# Patient Record
Sex: FEMALE | Race: WHITE | NOT HISPANIC OR LATINO | Employment: FULL TIME | URBAN - METROPOLITAN AREA
[De-identification: names, ages, dates, MRNs, and addresses within clinical notes are randomized per-mention and may not be internally consistent; named-entity substitution may affect disease eponyms.]

---

## 2017-08-02 ENCOUNTER — TRANSCRIBE ORDERS (OUTPATIENT)
Dept: MAMMOGRAPHY | Facility: MEDICAL CENTER | Age: 52
End: 2017-08-02

## 2017-08-02 ENCOUNTER — APPOINTMENT (OUTPATIENT)
Dept: RADIOLOGY | Facility: MEDICAL CENTER | Age: 52
End: 2017-08-02
Attending: UROLOGY
Payer: COMMERCIAL

## 2017-08-02 ENCOUNTER — GENERIC CONVERSION - ENCOUNTER (OUTPATIENT)
Dept: OTHER | Facility: OTHER | Age: 52
End: 2017-08-02

## 2017-08-02 DIAGNOSIS — N20.0 CALCULUS OF KIDNEY: ICD-10-CM

## 2017-08-02 PROCEDURE — 74000 HB X-RAY EXAM OF ABDOMEN (SINGLE ANTEROPOSTERIOR VIEW): CPT

## 2018-01-22 VITALS
SYSTOLIC BLOOD PRESSURE: 116 MMHG | DIASTOLIC BLOOD PRESSURE: 74 MMHG | HEIGHT: 63 IN | WEIGHT: 179 LBS | BODY MASS INDEX: 31.71 KG/M2

## 2018-06-11 LAB
ALBUMIN SERPL BCP-MCNC: 4.3 G/DL (ref 3–5.2)
ALP SERPL-CCNC: 91 U/L (ref 43–122)
ALT SERPL W P-5'-P-CCNC: 33 U/L (ref 9–52)
ANION GAP SERPL CALCULATED.3IONS-SCNC: 10 MMOL/L (ref 5–14)
AST SERPL W P-5'-P-CCNC: 26 U/L (ref 14–36)
BILIRUB SERPL-MCNC: 0.3 MG/DL
BUN SERPL-MCNC: 14 MG/DL (ref 5–25)
CALCIUM SERPL-MCNC: 9.8 MG/DL (ref 8.4–10.2)
CHLORIDE SERPL-SCNC: 102 MEQ/L (ref 97–108)
CHOLEST SERPL-MCNC: 248 MG/DL
CHOLEST/HDLC SERPL: 2.6 {RATIO}
CO2 SERPL-SCNC: 30 MMOL/L (ref 22–30)
CREATINE, SERUM (HISTORICAL): 1.01 MG/DL (ref 0.6–1.2)
EGFR (HISTORICAL): 58 ML/MIN/1.73 M2
GLUCOSE FASTING (HISTORICAL): 103 MG/DL (ref 70–99)
HDLC SERPL-MCNC: 95 MG/DL
LDL/HDL RATIO (HISTORICAL): 1.4
LDLC SERPL CALC-MCNC: 136 MG/DL
POTASSIUM SERPL-SCNC: 4.5 MEQ/L (ref 3.6–5)
SODIUM SERPL-SCNC: 142 MEQ/L (ref 137–147)
TOTAL PROTEIN (HISTORICAL): 7.5 G/DL (ref 5.9–8.4)
TRIGL SERPL-MCNC: 87 MG/DL
VLDLC SERPL CALC-MCNC: 17 MG/DL (ref 0–40)

## 2018-12-04 ENCOUNTER — OFFICE VISIT (OUTPATIENT)
Dept: FAMILY MEDICINE CLINIC | Facility: CLINIC | Age: 53
End: 2018-12-04
Payer: COMMERCIAL

## 2018-12-04 VITALS
SYSTOLIC BLOOD PRESSURE: 130 MMHG | HEIGHT: 63 IN | TEMPERATURE: 98.9 F | OXYGEN SATURATION: 97 % | WEIGHT: 180.6 LBS | BODY MASS INDEX: 32 KG/M2 | DIASTOLIC BLOOD PRESSURE: 80 MMHG | HEART RATE: 110 BPM | RESPIRATION RATE: 16 BRPM

## 2018-12-04 DIAGNOSIS — Z00.00 HEALTHCARE MAINTENANCE: Primary | ICD-10-CM

## 2018-12-04 DIAGNOSIS — R94.6 ABNORMAL RESULTS OF THYROID FUNCTION STUDIES: ICD-10-CM

## 2018-12-04 DIAGNOSIS — F32.A DEPRESSIVE DISORDER: ICD-10-CM

## 2018-12-04 DIAGNOSIS — E78.49 OTHER HYPERLIPIDEMIA: ICD-10-CM

## 2018-12-04 DIAGNOSIS — E55.9 VITAMIN D INSUFFICIENCY: ICD-10-CM

## 2018-12-04 PROBLEM — N20.0 KIDNEY STONE: Status: ACTIVE | Noted: 2017-08-02

## 2018-12-04 PROCEDURE — 99396 PREV VISIT EST AGE 40-64: CPT | Performed by: FAMILY MEDICINE

## 2018-12-04 RX ORDER — VENLAFAXINE 75 MG/1
TABLET ORAL
COMMUNITY

## 2018-12-04 RX ORDER — LIOTHYRONINE SODIUM 25 UG/1
25 TABLET ORAL DAILY
Refills: 1 | COMMUNITY
Start: 2018-10-08 | End: 2021-12-21

## 2018-12-04 RX ORDER — ERGOCALCIFEROL (VITAMIN D2) 10 MCG
TABLET ORAL
COMMUNITY

## 2018-12-04 RX ORDER — BREXPIPRAZOLE 0.5 MG/1
0.5 TABLET ORAL DAILY
Refills: 0 | COMMUNITY
Start: 2018-09-21 | End: 2018-12-04 | Stop reason: ALTCHOICE

## 2018-12-04 RX ORDER — BUPROPION HYDROCHLORIDE 300 MG/1
300 TABLET ORAL DAILY
Refills: 0 | COMMUNITY
Start: 2018-10-01 | End: 2020-01-20

## 2018-12-04 NOTE — PROGRESS NOTES
Assessment/Plan:     There are no diagnoses linked to this encounter  There are no Patient Instructions on file for this visit  No Follow-up on file  Subjective:      Patient ID: Reyna Leach is a 47 y o  female  Chief Complaint   Patient presents with    Annual Exam       She is here today for wellness visit  She just started exercising to try to lose some weight  She denies any complain  She had blood work done at work and it showed hyperlipidemia and slightly decreased GFR  The following portions of the patient's history were reviewed and updated as appropriate: allergies, current medications, past family history, past medical history, past social history, past surgical history and problem list     Review of Systems   Constitutional: Negative for chills and fever  HENT: Negative for trouble swallowing  Eyes: Negative for visual disturbance  Respiratory: Negative for cough and shortness of breath  Cardiovascular: Negative for chest pain, palpitations and leg swelling  Gastrointestinal: Negative for abdominal pain, constipation and diarrhea  Endocrine: Negative for cold intolerance and heat intolerance  Genitourinary: Negative for difficulty urinating and dysuria  Musculoskeletal: Negative for gait problem  Skin: Negative for rash  Neurological: Negative for dizziness, tremors, seizures and headaches  Hematological: Negative for adenopathy  Psychiatric/Behavioral: Negative for behavioral problems  Current Outpatient Prescriptions   Medication Sig Dispense Refill    buPROPion (WELLBUTRIN XL) 300 mg 24 hr tablet Take 300 mg by mouth daily  0    liothyronine (CYTOMEL) 25 mcg tablet Take 25 mcg by mouth daily  1    Multiple Vitamin (DAILY VALUE MULTIVITAMIN) TABS Take by mouth      venlafaxine (EFFEXOR) 75 mg tablet Take 75 mg by mouth 3 (three) times a day       No current facility-administered medications for this visit          Objective:    /80 (BP Location: Left arm, Patient Position: Sitting, Cuff Size: Adult)   Pulse (!) 110   Temp 98 9 °F (37 2 °C) (Tympanic)   Resp 16   Ht 5' 3" (1 6 m)   Wt 81 9 kg (180 lb 9 6 oz)   SpO2 97%   BMI 31 99 kg/m²        Physical Exam   Constitutional: She is oriented to person, place, and time  She appears well-developed and well-nourished  HENT:   Head: Normocephalic and atraumatic  Eyes: Pupils are equal, round, and reactive to light  EOM are normal    Neck: Normal range of motion  Neck supple  Cardiovascular: Normal rate, regular rhythm and normal heart sounds  Pulmonary/Chest: Effort normal and breath sounds normal    Abdominal: Soft  Bowel sounds are normal    Musculoskeletal: Normal range of motion  She exhibits no edema  Lymphadenopathy:     She has no cervical adenopathy  Neurological: She is alert and oriented to person, place, and time  No cranial nerve deficit  Skin: Skin is warm  Psychiatric: She has a normal mood and affect  Nursing note and vitals reviewed               Hiral Lock MD

## 2019-04-15 ENCOUNTER — TRANSCRIBE ORDERS (OUTPATIENT)
Dept: ADMINISTRATIVE | Facility: HOSPITAL | Age: 54
End: 2019-04-15

## 2019-04-15 ENCOUNTER — APPOINTMENT (OUTPATIENT)
Dept: LAB | Facility: IMAGING CENTER | Age: 54
End: 2019-04-15
Payer: COMMERCIAL

## 2019-04-15 DIAGNOSIS — E78.49 OTHER HYPERLIPIDEMIA: ICD-10-CM

## 2019-04-15 DIAGNOSIS — R94.6 ABNORMAL RESULTS OF THYROID FUNCTION STUDIES: ICD-10-CM

## 2019-04-15 DIAGNOSIS — F32.A DEPRESSIVE DISORDER: ICD-10-CM

## 2019-04-15 DIAGNOSIS — E55.9 VITAMIN D INSUFFICIENCY: ICD-10-CM

## 2019-04-15 LAB
25(OH)D3 SERPL-MCNC: 29.1 NG/ML (ref 30–100)
ALBUMIN SERPL BCP-MCNC: 3.9 G/DL (ref 3.5–5)
ALP SERPL-CCNC: 90 U/L (ref 46–116)
ALT SERPL W P-5'-P-CCNC: 31 U/L (ref 12–78)
ANION GAP SERPL CALCULATED.3IONS-SCNC: 4 MMOL/L (ref 4–13)
AST SERPL W P-5'-P-CCNC: 21 U/L (ref 5–45)
BASOPHILS # BLD AUTO: 0.04 THOUSANDS/ΜL (ref 0–0.1)
BASOPHILS NFR BLD AUTO: 1 % (ref 0–1)
BILIRUB SERPL-MCNC: 0.41 MG/DL (ref 0.2–1)
BUN SERPL-MCNC: 13 MG/DL (ref 5–25)
CALCIUM SERPL-MCNC: 8.8 MG/DL (ref 8.3–10.1)
CHLORIDE SERPL-SCNC: 106 MMOL/L (ref 100–108)
CHOLEST SERPL-MCNC: 241 MG/DL (ref 50–200)
CO2 SERPL-SCNC: 28 MMOL/L (ref 21–32)
CREAT SERPL-MCNC: 1.08 MG/DL (ref 0.6–1.3)
EOSINOPHIL # BLD AUTO: 0.25 THOUSAND/ΜL (ref 0–0.61)
EOSINOPHIL NFR BLD AUTO: 4 % (ref 0–6)
ERYTHROCYTE [DISTWIDTH] IN BLOOD BY AUTOMATED COUNT: 12.4 % (ref 11.6–15.1)
GFR SERPL CREATININE-BSD FRML MDRD: 59 ML/MIN/1.73SQ M
GLUCOSE P FAST SERPL-MCNC: 98 MG/DL (ref 65–99)
HCT VFR BLD AUTO: 43.8 % (ref 34.8–46.1)
HDLC SERPL-MCNC: 85 MG/DL (ref 40–60)
HGB BLD-MCNC: 14.4 G/DL (ref 11.5–15.4)
IMM GRANULOCYTES # BLD AUTO: 0.01 THOUSAND/UL (ref 0–0.2)
IMM GRANULOCYTES NFR BLD AUTO: 0 % (ref 0–2)
LDLC SERPL CALC-MCNC: 142 MG/DL (ref 0–100)
LYMPHOCYTES # BLD AUTO: 1.9 THOUSANDS/ΜL (ref 0.6–4.47)
LYMPHOCYTES NFR BLD AUTO: 30 % (ref 14–44)
MCH RBC QN AUTO: 30.6 PG (ref 26.8–34.3)
MCHC RBC AUTO-ENTMCNC: 32.9 G/DL (ref 31.4–37.4)
MCV RBC AUTO: 93 FL (ref 82–98)
MONOCYTES # BLD AUTO: 0.43 THOUSAND/ΜL (ref 0.17–1.22)
MONOCYTES NFR BLD AUTO: 7 % (ref 4–12)
NEUTROPHILS # BLD AUTO: 3.69 THOUSANDS/ΜL (ref 1.85–7.62)
NEUTS SEG NFR BLD AUTO: 58 % (ref 43–75)
NRBC BLD AUTO-RTO: 0 /100 WBCS
PLATELET # BLD AUTO: 352 THOUSANDS/UL (ref 149–390)
PMV BLD AUTO: 10.6 FL (ref 8.9–12.7)
POTASSIUM SERPL-SCNC: 4.2 MMOL/L (ref 3.5–5.3)
PROT SERPL-MCNC: 7.4 G/DL (ref 6.4–8.2)
RBC # BLD AUTO: 4.7 MILLION/UL (ref 3.81–5.12)
SODIUM SERPL-SCNC: 138 MMOL/L (ref 136–145)
TRIGL SERPL-MCNC: 68 MG/DL
TSH SERPL DL<=0.05 MIU/L-ACNC: 2.33 UIU/ML (ref 0.36–3.74)
WBC # BLD AUTO: 6.32 THOUSAND/UL (ref 4.31–10.16)

## 2019-04-15 PROCEDURE — 80053 COMPREHEN METABOLIC PANEL: CPT

## 2019-04-15 PROCEDURE — 80061 LIPID PANEL: CPT

## 2019-04-15 PROCEDURE — 36415 COLL VENOUS BLD VENIPUNCTURE: CPT

## 2019-04-15 PROCEDURE — 84443 ASSAY THYROID STIM HORMONE: CPT

## 2019-04-15 PROCEDURE — 85025 COMPLETE CBC W/AUTO DIFF WBC: CPT

## 2019-04-15 PROCEDURE — 82306 VITAMIN D 25 HYDROXY: CPT

## 2019-04-22 ENCOUNTER — OFFICE VISIT (OUTPATIENT)
Dept: FAMILY MEDICINE CLINIC | Facility: CLINIC | Age: 54
End: 2019-04-22
Payer: COMMERCIAL

## 2019-04-22 VITALS
WEIGHT: 178.25 LBS | DIASTOLIC BLOOD PRESSURE: 90 MMHG | BODY MASS INDEX: 31.58 KG/M2 | SYSTOLIC BLOOD PRESSURE: 132 MMHG | TEMPERATURE: 98.6 F | HEART RATE: 98 BPM | RESPIRATION RATE: 16 BRPM | OXYGEN SATURATION: 100 % | HEIGHT: 63 IN

## 2019-04-22 DIAGNOSIS — E78.49 OTHER HYPERLIPIDEMIA: Primary | ICD-10-CM

## 2019-04-22 DIAGNOSIS — Z12.31 ENCOUNTER FOR SCREENING MAMMOGRAM FOR MALIGNANT NEOPLASM OF BREAST: ICD-10-CM

## 2019-04-22 DIAGNOSIS — F32.A DEPRESSIVE DISORDER: ICD-10-CM

## 2019-04-22 DIAGNOSIS — Z12.11 ENCOUNTER FOR SCREENING FOR MALIGNANT NEOPLASM OF COLON: ICD-10-CM

## 2019-04-22 DIAGNOSIS — E55.9 VITAMIN D INSUFFICIENCY: ICD-10-CM

## 2019-04-22 PROCEDURE — 99214 OFFICE O/P EST MOD 30 MIN: CPT | Performed by: FAMILY MEDICINE

## 2019-04-22 PROCEDURE — 3008F BODY MASS INDEX DOCD: CPT | Performed by: FAMILY MEDICINE

## 2019-04-22 RX ORDER — ATORVASTATIN CALCIUM 20 MG/1
20 TABLET, FILM COATED ORAL DAILY
Qty: 30 TABLET | Refills: 5 | Status: CANCELLED | OUTPATIENT
Start: 2019-04-22

## 2020-01-17 DIAGNOSIS — F32.A DEPRESSION: Primary | ICD-10-CM

## 2020-01-20 RX ORDER — BUPROPION HYDROCHLORIDE 300 MG/1
TABLET ORAL
Qty: 90 TABLET | Refills: 0 | Status: SHIPPED | OUTPATIENT
Start: 2020-01-20

## 2020-02-13 RX ORDER — LIOTHYRONINE SODIUM 25 UG/1
TABLET ORAL
Qty: 30 TABLET | Refills: 5 | OUTPATIENT
Start: 2020-02-13

## 2020-02-14 RX ORDER — LIOTHYRONINE SODIUM 25 UG/1
TABLET ORAL
Qty: 30 TABLET | Refills: 5 | OUTPATIENT
Start: 2020-02-14

## 2020-08-11 ENCOUNTER — APPOINTMENT (EMERGENCY)
Dept: CT IMAGING | Facility: HOSPITAL | Age: 55
End: 2020-08-11
Payer: COMMERCIAL

## 2020-08-11 ENCOUNTER — HOSPITAL ENCOUNTER (EMERGENCY)
Facility: HOSPITAL | Age: 55
Discharge: HOME | End: 2020-08-11
Attending: EMERGENCY MEDICINE | Admitting: EMERGENCY MEDICINE
Payer: COMMERCIAL

## 2020-08-11 VITALS
SYSTOLIC BLOOD PRESSURE: 140 MMHG | HEART RATE: 90 BPM | RESPIRATION RATE: 16 BRPM | BODY MASS INDEX: 32.26 KG/M2 | OXYGEN SATURATION: 97 % | DIASTOLIC BLOOD PRESSURE: 70 MMHG | WEIGHT: 182.1 LBS | TEMPERATURE: 98.7 F

## 2020-08-11 DIAGNOSIS — K52.9 COLITIS: Primary | ICD-10-CM

## 2020-08-11 DIAGNOSIS — K62.5 RECTAL BLEEDING: ICD-10-CM

## 2020-08-11 LAB
ALBUMIN SERPL BCP-MCNC: 4.1 G/DL (ref 3.5–5)
ALP SERPL-CCNC: 98 U/L (ref 46–116)
ALT SERPL W P-5'-P-CCNC: 39 U/L (ref 12–78)
ANION GAP SERPL CALCULATED.3IONS-SCNC: 12 MMOL/L (ref 4–13)
APTT PPP: 26 SECONDS (ref 23–37)
AST SERPL W P-5'-P-CCNC: 26 U/L (ref 5–45)
BACTERIA UR QL AUTO: ABNORMAL /HPF
BASOPHILS # BLD AUTO: 0.02 THOUSANDS/ΜL (ref 0–0.1)
BASOPHILS NFR BLD AUTO: 0 % (ref 0–1)
BILIRUB SERPL-MCNC: 0.38 MG/DL (ref 0.2–1)
BILIRUB UR QL STRIP: NEGATIVE
BUN SERPL-MCNC: 10 MG/DL (ref 5–25)
CALCIUM SERPL-MCNC: 9 MG/DL (ref 8.3–10.1)
CHLORIDE SERPL-SCNC: 105 MMOL/L (ref 100–108)
CLARITY UR: CLEAR
CO2 SERPL-SCNC: 25 MMOL/L (ref 21–32)
COLOR UR: YELLOW
CREAT SERPL-MCNC: 1.29 MG/DL (ref 0.6–1.3)
EOSINOPHIL # BLD AUTO: 0.09 THOUSAND/ΜL (ref 0–0.61)
EOSINOPHIL NFR BLD AUTO: 1 % (ref 0–6)
ERYTHROCYTE [DISTWIDTH] IN BLOOD BY AUTOMATED COUNT: 12.4 % (ref 11.6–15.1)
GFR SERPL CREATININE-BSD FRML MDRD: 47 ML/MIN/1.73SQ M
GLUCOSE SERPL-MCNC: 79 MG/DL (ref 65–140)
GLUCOSE UR STRIP-MCNC: NEGATIVE MG/DL
HCT VFR BLD AUTO: 44.1 % (ref 34.8–46.1)
HGB BLD-MCNC: 14.2 G/DL (ref 11.5–15.4)
HGB UR QL STRIP.AUTO: NEGATIVE
IMM GRANULOCYTES # BLD AUTO: 0.05 THOUSAND/UL (ref 0–0.2)
IMM GRANULOCYTES NFR BLD AUTO: 0 % (ref 0–2)
INR PPP: 0.94 (ref 0.84–1.19)
KETONES UR STRIP-MCNC: NEGATIVE MG/DL
LEUKOCYTE ESTERASE UR QL STRIP: NEGATIVE
LYMPHOCYTES # BLD AUTO: 1.85 THOUSANDS/ΜL (ref 0.6–4.47)
LYMPHOCYTES NFR BLD AUTO: 17 % (ref 14–44)
MCH RBC QN AUTO: 30.7 PG (ref 26.8–34.3)
MCHC RBC AUTO-ENTMCNC: 32.2 G/DL (ref 31.4–37.4)
MCV RBC AUTO: 95 FL (ref 82–98)
MONOCYTES # BLD AUTO: 0.62 THOUSAND/ΜL (ref 0.17–1.22)
MONOCYTES NFR BLD AUTO: 6 % (ref 4–12)
NEUTROPHILS # BLD AUTO: 8.54 THOUSANDS/ΜL (ref 1.85–7.62)
NEUTS SEG NFR BLD AUTO: 76 % (ref 43–75)
NITRITE UR QL STRIP: NEGATIVE
NON-SQ EPI CELLS URNS QL MICRO: ABNORMAL /HPF
NRBC BLD AUTO-RTO: 0 /100 WBCS
PH UR STRIP.AUTO: 5.5 [PH] (ref 4.5–8)
PLATELET # BLD AUTO: 357 THOUSANDS/UL (ref 149–390)
PMV BLD AUTO: 9.8 FL (ref 8.9–12.7)
POTASSIUM SERPL-SCNC: 3.3 MMOL/L (ref 3.5–5.3)
PROT SERPL-MCNC: 8 G/DL (ref 6.4–8.2)
PROT UR STRIP-MCNC: ABNORMAL MG/DL
PROTHROMBIN TIME: 12.4 SECONDS (ref 11.6–14.5)
RBC # BLD AUTO: 4.63 MILLION/UL (ref 3.81–5.12)
RBC #/AREA URNS AUTO: ABNORMAL /HPF
SODIUM SERPL-SCNC: 142 MMOL/L (ref 136–145)
SP GR UR STRIP.AUTO: >=1.03 (ref 1–1.03)
UROBILINOGEN UR QL STRIP.AUTO: 0.2 E.U./DL
WBC # BLD AUTO: 11.17 THOUSAND/UL (ref 4.31–10.16)
WBC #/AREA URNS AUTO: ABNORMAL /HPF

## 2020-08-11 PROCEDURE — 36415 COLL VENOUS BLD VENIPUNCTURE: CPT | Performed by: PHYSICIAN ASSISTANT

## 2020-08-11 PROCEDURE — G1004 CDSM NDSC: HCPCS

## 2020-08-11 PROCEDURE — 85610 PROTHROMBIN TIME: CPT | Performed by: PHYSICIAN ASSISTANT

## 2020-08-11 PROCEDURE — 74178 CT ABD&PLV WO CNTR FLWD CNTR: CPT

## 2020-08-11 PROCEDURE — 99285 EMERGENCY DEPT VISIT HI MDM: CPT

## 2020-08-11 PROCEDURE — 81001 URINALYSIS AUTO W/SCOPE: CPT

## 2020-08-11 PROCEDURE — 80053 COMPREHEN METABOLIC PANEL: CPT | Performed by: PHYSICIAN ASSISTANT

## 2020-08-11 PROCEDURE — 99284 EMERGENCY DEPT VISIT MOD MDM: CPT | Performed by: EMERGENCY MEDICINE

## 2020-08-11 PROCEDURE — 85730 THROMBOPLASTIN TIME PARTIAL: CPT | Performed by: PHYSICIAN ASSISTANT

## 2020-08-11 PROCEDURE — 96360 HYDRATION IV INFUSION INIT: CPT

## 2020-08-11 PROCEDURE — 85025 COMPLETE CBC W/AUTO DIFF WBC: CPT | Performed by: PHYSICIAN ASSISTANT

## 2020-08-11 RX ORDER — POTASSIUM CHLORIDE 20 MEQ/1
40 TABLET, EXTENDED RELEASE ORAL ONCE
Status: COMPLETED | OUTPATIENT
Start: 2020-08-11 | End: 2020-08-11

## 2020-08-11 RX ADMIN — IOHEXOL 100 ML: 350 INJECTION, SOLUTION INTRAVENOUS at 15:51

## 2020-08-11 RX ADMIN — SODIUM CHLORIDE 1000 ML: 0.9 INJECTION, SOLUTION INTRAVENOUS at 14:54

## 2020-08-11 RX ADMIN — POTASSIUM CHLORIDE 40 MEQ: 1500 TABLET, EXTENDED RELEASE ORAL at 15:58

## 2020-08-11 NOTE — ED ATTENDING ATTESTATION
8/11/2020  I, Jerilyn Melendez DO, saw and evaluated the patient  I have discussed the patient with the resident/non-physician practitioner and agree with the resident's/non-physician practitioner's findings, Plan of Care, and MDM as documented in the resident's/non-physician practitioner's note, except where noted  All available labs and Radiology studies were reviewed  I was present for key portions of any procedure(s) performed by the resident/non-physician practitioner and I was immediately available to provide assistance  At this point I agree with the current assessment done in the Emergency Department  I have conducted an independent evaluation of this patient a history and physical is as follows:    48 yo F presenting for evaluation of blood in stools  Pt reports that her last normal BM was yesterday which was diarrhea and without any blood/dark color  Today, pt had a BM around 8:30 am that is described as maroon colored with clot  This was followed by 3 episodes this afternoon, the 1st maroon and the 2nd due being described as wojciech blood without any stool mixed in  Denies any constipation/straining  No history of similar  Mother with h/o colon ca diagnosed in late 35s, therefore pt has been having serial colonoscopies for some time, however it has been a few years since her last  No blood thinners  Denies other areas of bleeding  No sx of symptomatic anemia such as lightheadedness, dizziness, near syncope, CP, SOB  Denies any abdominal pain, N/V  She does report urinary frequency  Abdomen soft/nontender, rectal exam performed by AP with no external/internal abnormalities noted, mucous hemoccult negative      MDM: 48 yo F with reported blood per rectum- will get CBC to r/o anemia and transfuse if needed, check coags, CMP to assess liver function/renal function/lytes, IVF, CT A/P to assess for mass/possible area of active bleeding, if all stable will have pt f/u with GI for repeat colonoscopy if needed, close return precautions         ED Course         Critical Care Time  Procedures

## 2020-08-11 NOTE — DISCHARGE INSTRUCTIONS
Please call [facility] or your gastroenterologist to schedule a follow up appointment to discuss EGD/colonoscopy  Return to the ED with any new or worsening symptoms as discussed

## 2020-08-11 NOTE — ED PROVIDER NOTES
History  Chief Complaint   Patient presents with    Black or Bloody Stool     patient c/o blood from anus that started at 0830  denies dizziness and SOB  stating she has " diarrhea and its a dark red color"  Jose Mcmillan is a 49year old female with no significant past medical history presenting to the ED for evaluation of rectal bleeding beginning this morning with 3 episodes in total  Patient relates that this morning she had a bowel movement around 830 in which there was stool and a small amount of maroon-colored blood present  She states that later this afternoon she had two incidents of urgency to have a bowel movement but only a small amount of wojciech red and maroon colored blood with clots was present in the toilet bowel  She denies any abdominal pain or rectal pain, dizziness, lightheadedness, weakness, pallor, back pain or flank pain  Patient denies constipation admitting to an episode of diarrhea yesterday evening  She does admit to urinary frequency but attributes this to drinking a lot of soda  She denies dysuria or hematuria  Denies fevers, chills, sweats, chest pain, palpitations, shortness of breath  Additionally denies anticoagulant use  She admits to having episodes of rectal bleeding in the past but not to this extent  Of note, patient does have a family history of colon cancer for which the patient has colonoscopies performed every 5 years  She is unsure of her last colonoscopy but thinks it was performed within the past 3-5 years  History provided by:  Patient  Black or Bloody Stool   Amount:   Moderate  Timing:  Intermittent  Chronicity:  New  Context: diarrhea    Context: not anal fissures, not constipation, not hemorrhoids and not rectal pain    Relieved by:  Nothing  Ineffective treatments:  None tried  Associated symptoms: no abdominal pain, no dizziness, no fever, no hematemesis, no light-headedness, no loss of consciousness, no recent illness and no vomiting    Risk factors: no anticoagulant use and no hx of colorectal cancer (family history of colon caner)        Prior to Admission Medications   Prescriptions Last Dose Informant Patient Reported? Taking? Multiple Vitamin (DAILY VALUE MULTIVITAMIN) TABS  Self Yes No   Sig: Take by mouth   buPROPion (WELLBUTRIN XL) 300 mg 24 hr tablet   No No   Sig: TAKE 1 TABLET BY MOUTH EVERY DAY   liothyronine (CYTOMEL) 25 mcg tablet  Self Yes No   Sig: Take 25 mcg by mouth daily   venlafaxine (EFFEXOR) 75 mg tablet  Self Yes No   Sig: Take 75 mg by mouth daily Take 3 tablets in the am      Facility-Administered Medications: None       Past Medical History:   Diagnosis Date    Depression     Disease of thyroid gland     Hyperlipidemia     Hyperlipidemia     Kidney stone        Past Surgical History:   Procedure Laterality Date    ANTERIOR CRUCIATE LIGAMENT REPAIR Right     CYST REMOVAL      left knee carilage/cyst removed    HERNIA REPAIR      removal       Family History   Problem Relation Age of Onset    Colon cancer Mother 44     I have reviewed and agree with the history as documented  E-Cigarette/Vaping     E-Cigarette/Vaping Substances     Social History     Tobacco Use    Smoking status: Never Smoker    Smokeless tobacco: Never Used   Substance Use Topics    Alcohol use: Yes     Comment: socially    Drug use: No       Review of Systems   Constitutional: Negative for appetite change, chills and fever  Respiratory: Negative for cough, chest tightness and shortness of breath  Cardiovascular: Negative for chest pain and palpitations  Gastrointestinal: Positive for anal bleeding and blood in stool  Negative for abdominal pain, constipation, hematemesis, nausea, rectal pain and vomiting  Genitourinary: Positive for frequency  Negative for difficulty urinating, dysuria, flank pain and hematuria  Musculoskeletal: Negative for back pain and neck pain  Skin: Negative for pallor     Neurological: Negative for dizziness, loss of consciousness, weakness, light-headedness and headaches  All other systems reviewed and are negative  Physical Exam  Physical Exam  Vitals signs and nursing note reviewed  Exam conducted with a chaperone present  Constitutional:       General: She is not in acute distress  Appearance: She is well-developed  HENT:      Head: Normocephalic and atraumatic  Eyes:      Conjunctiva/sclera: Conjunctivae normal       Pupils: Pupils are equal, round, and reactive to light  Neck:      Musculoskeletal: Normal range of motion and neck supple  Cardiovascular:      Rate and Rhythm: Regular rhythm  Tachycardia present  Heart sounds: Normal heart sounds  Pulmonary:      Effort: Pulmonary effort is normal  No respiratory distress  Breath sounds: Normal breath sounds  No wheezing  Abdominal:      General: Bowel sounds are normal  There is no distension  Palpations: Abdomen is soft  Tenderness: There is no abdominal tenderness  There is no guarding or rebound  Comments:   Obese  Rectal exam performed with chaperone present  No external hemorrhoids or fissures on inspection  SHANE unremarkable without palpable masses or internal hemorrhoids  There was a minimal amount of mucous present in the rectal vault which was hemoccult negative  Musculoskeletal: Normal range of motion  General: No tenderness  Right lower leg: No edema  Left lower leg: No edema  Skin:     General: Skin is warm and dry  Capillary Refill: Capillary refill takes less than 2 seconds  Coloration: Skin is not pale  Neurological:      General: No focal deficit present  Mental Status: She is alert and oriented to person, place, and time  Mental status is at baseline  Sensory: No sensory deficit           Vital Signs  ED Triage Vitals [08/11/20 1355]   Temperature Pulse Respirations Blood Pressure SpO2   98 7 °F (37 1 °C) (!) 111 18 148/77 96 %      Temp Source Heart Rate Source Patient Position - Orthostatic VS BP Location FiO2 (%)   Oral Monitor Sitting Right arm --      Pain Score       No Pain           Vitals:    08/11/20 1355 08/11/20 1459 08/11/20 1630   BP: 148/77  140/70   Pulse: (!) 111 102 90   Patient Position - Orthostatic VS: Sitting           Visual Acuity      ED Medications  Medications   sodium chloride 0 9 % bolus 1,000 mL (0 mL Intravenous Stopped 8/11/20 1557)   potassium chloride (K-DUR,KLOR-CON) CR tablet 40 mEq (40 mEq Oral Given 8/11/20 1558)   iohexol (OMNIPAQUE) 350 MG/ML injection (MULTI-DOSE) 100 mL (100 mL Intravenous Given 8/11/20 1551)       Diagnostic Studies  Results Reviewed     Procedure Component Value Units Date/Time    Urine Microscopic [[id number]]  (Abnormal) Collected:  08/11/20 1457    Lab Status:  Final result Specimen:  Urine Updated:  08/11/20 1538     RBC, UA 0-1 /hpf      WBC, UA 1-2 /hpf      Epithelial Cells Occasional /hpf      Bacteria, UA Occasional /hpf     Comprehensive metabolic panel [[id number]]  (Abnormal) Collected:  08/11/20 1453    Lab Status:  Final result Specimen:  Blood from Arm, Left Updated:  08/11/20 1523     Sodium 142 mmol/L      Potassium 3 3 mmol/L      Chloride 105 mmol/L      CO2 25 mmol/L      ANION GAP 12 mmol/L      BUN 10 mg/dL      Creatinine 1 29 mg/dL      Glucose 79 mg/dL      Calcium 9 0 mg/dL      AST 26 U/L      ALT 39 U/L      Alkaline Phosphatase 98 U/L      Total Protein 8 0 g/dL      Albumin 4 1 g/dL      Total Bilirubin 0 38 mg/dL      eGFR 47 ml/min/1 73sq m     Narrative:       [place] guidelines for Chronic Kidney Disease (CKD):     Stage 1 with normal or high GFR (GFR > 90 mL/min/1 73 square meters)    Stage 2 Mild CKD (GFR = 60-89 mL/min/1 73 square meters)    Stage 3A Moderate CKD (GFR = 45-59 mL/min/1 73 square meters)    Stage 3B Moderate CKD (GFR = 30-44 mL/min/1 73 square meters)    Stage 4 Severe CKD (GFR = 15-29 mL/min/1 73 square meters)    Stage 5 End Stage CKD (GFR <15 mL/min/1 73 square meters)  Note: GFR calculation is accurate only with a steady state creatinine    Protime-INR [[id number]]  (Normal) Collected:  08/11/20 1453    Lab Status:  Final result Specimen:  Blood from Arm, Left Updated:  08/11/20 1514     Protime 12 4 seconds      INR 0 94    APTT [[id number]]  (Normal) Collected:  08/11/20 1453    Lab Status:  Final result Specimen:  Blood from Arm, Left Updated:  08/11/20 1514     PTT 26 seconds     CBC and differential [[id number]]  (Abnormal) Collected:  08/11/20 1453    Lab Status:  Final result Specimen:  Blood from Arm, Left Updated:  08/11/20 1502     WBC 11 17 Thousand/uL      RBC 4 63 Million/uL      Hemoglobin 14 2 g/dL      Hematocrit 44 1 %      MCV 95 fL      MCH 30 7 pg      MCHC 32 2 g/dL      RDW 12 4 %      MPV 9 8 fL      Platelets 205 Thousands/uL      nRBC 0 /100 WBCs      Neutrophils Relative 76 %      Immat GRANS % 0 %      Lymphocytes Relative 17 %      Monocytes Relative 6 %      Eosinophils Relative 1 %      Basophils Relative 0 %      Neutrophils Absolute 8 54 Thousands/µL      Immature Grans Absolute 0 05 Thousand/uL      Lymphocytes Absolute 1 85 Thousands/µL      Monocytes Absolute 0 62 Thousand/µL      Eosinophils Absolute 0 09 Thousand/µL      Basophils Absolute 0 02 Thousands/µL     POCT urinalysis dipstick [[id number]]  (Abnormal) Resulted:  08/11/20 1501    Lab Status:  Final result Specimen:  Urine Updated:  08/11/20 1501    Urine Macroscopic, POC [[id number]]  (Abnormal) Collected:  08/11/20 1457    Lab Status:  Final result Specimen:  Urine Updated:  08/11/20 1458     Color, UA Yellow     Clarity, UA Clear     pH, UA 5 5     Leukocytes, UA Negative     Nitrite, UA Negative     Protein, UA Trace mg/dl      Glucose, UA Negative mg/dl      Ketones, UA Negative mg/dl      Urobilinogen, UA 0 2 E U /dl      Bilirubin, UA Negative     Blood, UA Negative     Specific Gravity, UA >=1 030    Narrative:       CLINITEK RESULT CT high volume lower GI bleed   Final Result by Radha Bermudez MD (08/11 1630)      No CT evidence of active high volume gastrointestinal hemorrhage  Colitis of the descending and rectosigmoid colon favored to be infectious or inflammatory  Hepatic steatosis  Workstation performed: [id number]                    Procedures  Procedures         ED Course  ED Course as of Aug 11 1717   Tue Aug 11, 2020   1508 Will give fluids and reassess   Pulse(!): 111   1541 Potassium(!): 3 3   1609 Patient reassessed, advised of lab results  Offers no complaints at this time  Awaiting results of CT      1645 CT IMPRESSION:     No CT evidence of active high volume gastrointestinal hemorrhage  Colitis of the descending and rectosigmoid colon favored to be infectious or inflammatory  Hepatic steatosis  US AUDIT      Most Recent Value   Initial Alcohol Screen: US AUDIT-C    1  How often do you have a drink containing alcohol? 1 Filed at: 08/11/2020 1428   2  How many drinks containing alcohol do you have on a typical day you are drinking? 0 Filed at: 08/11/2020 1428   3b  FEMALE Any Age, or MALE 65+: How often do you have 4 or more drinks on one occassion? 0 Filed at: 08/11/2020 1428   Audit-C Score  1 Filed at: 08/11/2020 1428                  MDM  Number of Diagnoses or Management Options  Colitis: new and requires workup  Rectal bleeding: new and requires workup  Diagnosis management comments: This is an otherwise healthy 49year-old female presenting for evaluation of rectal bleeding that began this morning  She reports having 2 mild-moderate episodes of maroon-colored bleeding with clots per rectum when she had tried to have a bowel movement  She offers no other complaints at this time, denying any dizziness, weakness, lightheadedness, chest pain, or palpitations  No anticoagulant use  Positive family history of colon cancer but past colonoscopies have been unremarkable   Patient relates she does need to call her GI Dr  To schedule a repeat colonoscopy as she is unsure of when her last test was performed  Differential diagnosis includes but not limited to: lower GI bleed, colitis, electrolyte disturbance, dehydration  No evidence of external hemorrhoids or anal fissure on examination    Initial ED plan: fluids, labs, imaging    Final ED Assessment: Vitals stable on arrival, though patient slightly tachycardic  This improved with a fluid bolus  Rectal exam unrevealing, stool hemoccult negative  Labs unremarkable, hbg stable  K 3 3 and PO supplementation ordered  CT GI bleed study reported findings of colitis, no evidence of active high volume GI hemorrhage  Patient advised of all lab and imaging results and plan for discharge home with GI follow up  She is understanding and agreeable with discharge plan and outpatient follow up  Return parameters discussed at length  Stable for discharge home in good condition  Amount and/or Complexity of Data Reviewed  Clinical lab tests: ordered and reviewed  Tests in the radiology section of CPT®: ordered and reviewed  Review and summarize past medical records: yes  Discuss the patient with other providers: yes  Independent visualization of images, tracings, or specimens: yes    Risk of Complications, Morbidity, and/or Mortality  Presenting problems: low  Diagnostic procedures: low  Management options: low    Patient Progress  Patient progress: stable        Disposition  Final diagnoses:   Colitis   Rectal bleeding     Time reflects when diagnosis was documented in both MDM as applicable and the Disposition within this note     Time User Action Codes Description Comment    8/11/2020  4:55 PM Margareth Landis Add [K52 9] Colitis     8/11/2020  4:55 PM Margareth Landis Add [K62 5] Rectal bleeding       ED Disposition     ED Disposition Condition Date/Time Comment    Discharge Stable Tue Aug 11, 2020  4:55 PM Frederick Atkins discharge to home/self care  Follow-up Information     Follow up With Specialties Details Why Contact Info Additional Information    Hussain Kuhn MD Family Medicine  As needed [address] [facility] Emergency Department Emergency Medicine  If symptoms worsen [facility] [id number] [phone] [place] ED, [address]  , [facility] [place], [address] [facility] Schedule an appointment as soon as possible for a visit   [address]  [address] [facility] [address]  Kamini Shaw [address] [facility], [address], [address], [facility], [place], [address] [phone]          Discharge Medication List as of 8/11/2020  4:57 PM      CONTINUE these medications which have NOT CHANGED    Details   buPROPion (WELLBUTRIN XL) 300 mg 24 hr tablet TAKE 1 TABLET BY MOUTH EVERY DAY, Normal      liothyronine (CYTOMEL) 25 mcg tablet Take 25 mcg by mouth daily, Starting Mon 10/8/2018, Historical Med      Multiple Vitamin (DAILY VALUE MULTIVITAMIN) TABS Take by mouth, Historical Med      venlafaxine (EFFEXOR) 75 mg tablet Take 75 mg by mouth daily Take 3 tablets in the am, Historical Med           No discharge procedures on file      PDMP Review     None          ED Provider  Electronically Signed by           Sofia Deras PA-C  08/11/20 9229

## 2020-08-17 ENCOUNTER — TELEPHONE (OUTPATIENT)
Dept: ADMINISTRATIVE | Facility: OTHER | Age: 55
End: 2020-08-17

## 2020-08-17 NOTE — TELEPHONE ENCOUNTER
Upon review of your request/inquiry, we have found/obtained the documentation  The status of this report is in progress/pending/awaiting final  Due to protocols, we are unable to hold requests for resulting/linking of a pending/ in progress/awaiting final items and are unable to proceed  Any additional questions or concerns should be emailed to the Practice Liaisons via [email or web address] email, please do not reply via In Basket       Thank you  Alvaro Anaya

## 2020-08-17 NOTE — TELEPHONE ENCOUNTER
----- Message from Delmy Levine sent at 8/17/2020  2:08 PM EDT -----  Regarding: Mammo  08/17/20 2:08 PM    Chel, our patient Natalia Fred has had Mammogram completed/performed  Please assist in updating the patient chart by pulling the document from Encounters Tab within Chart Review   The date of service is 8/12/20     Thank you,  Delmy Levine   S Sushma Roberson

## 2020-10-26 DIAGNOSIS — F32.A DEPRESSION: ICD-10-CM

## 2020-10-27 RX ORDER — BUPROPION HYDROCHLORIDE 300 MG/1
TABLET ORAL
Qty: 90 TABLET | Refills: 1 | OUTPATIENT
Start: 2020-10-27

## 2021-02-09 ENCOUNTER — TELEPHONE (OUTPATIENT)
Dept: FAMILY MEDICINE CLINIC | Facility: CLINIC | Age: 56
End: 2021-02-09

## 2021-02-14 DIAGNOSIS — F32.A DEPRESSION: ICD-10-CM

## 2021-02-15 RX ORDER — BUPROPION HYDROCHLORIDE 300 MG/1
TABLET ORAL
Qty: 90 TABLET | Refills: 1 | OUTPATIENT
Start: 2021-02-15

## 2021-03-25 ENCOUNTER — VBI (OUTPATIENT)
Dept: ADMINISTRATIVE | Facility: OTHER | Age: 56
End: 2021-03-25

## 2021-03-26 DIAGNOSIS — Z23 ENCOUNTER FOR IMMUNIZATION: ICD-10-CM

## 2021-04-24 DIAGNOSIS — F32.A DEPRESSION: ICD-10-CM

## 2021-04-26 RX ORDER — BUPROPION HYDROCHLORIDE 300 MG/1
TABLET ORAL
Qty: 90 TABLET | Refills: 1 | OUTPATIENT
Start: 2021-04-26

## 2021-07-06 ENCOUNTER — OFFICE VISIT (OUTPATIENT)
Dept: FAMILY MEDICINE CLINIC | Facility: CLINIC | Age: 56
End: 2021-07-06
Payer: COMMERCIAL

## 2021-07-06 VITALS
RESPIRATION RATE: 16 BRPM | BODY MASS INDEX: 32.92 KG/M2 | SYSTOLIC BLOOD PRESSURE: 118 MMHG | TEMPERATURE: 98.2 F | DIASTOLIC BLOOD PRESSURE: 88 MMHG | WEIGHT: 185.8 LBS | HEIGHT: 63 IN | HEART RATE: 112 BPM

## 2021-07-06 DIAGNOSIS — Z00.00 HEALTHCARE MAINTENANCE: Primary | ICD-10-CM

## 2021-07-06 DIAGNOSIS — F32.A DEPRESSIVE DISORDER: ICD-10-CM

## 2021-07-06 DIAGNOSIS — G47.39 OTHER SLEEP APNEA: ICD-10-CM

## 2021-07-06 DIAGNOSIS — Z12.31 SCREENING MAMMOGRAM, ENCOUNTER FOR: ICD-10-CM

## 2021-07-06 DIAGNOSIS — G44.219 EPISODIC TENSION-TYPE HEADACHE, NOT INTRACTABLE: ICD-10-CM

## 2021-07-06 DIAGNOSIS — R06.83 SNORING: ICD-10-CM

## 2021-07-06 PROCEDURE — 99396 PREV VISIT EST AGE 40-64: CPT | Performed by: FAMILY MEDICINE

## 2021-07-06 NOTE — ASSESSMENT & PLAN NOTE
CBC, CMP, TSH, and lipid panel ordered  Screening mammogram ordered  Recommend regular follow-up in 6 months

## 2021-07-06 NOTE — ASSESSMENT & PLAN NOTE
Symptoms not fully improved but stable on Effexor and Wellbutrin   Continue as directed by psychiatrist
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
## 2021-07-16 ENCOUNTER — APPOINTMENT (OUTPATIENT)
Dept: LAB | Facility: IMAGING CENTER | Age: 56
End: 2021-07-16
Payer: COMMERCIAL

## 2021-07-16 ENCOUNTER — CLINICAL SUPPORT (OUTPATIENT)
Dept: FAMILY MEDICINE CLINIC | Facility: CLINIC | Age: 56
End: 2021-07-16
Payer: COMMERCIAL

## 2021-07-16 DIAGNOSIS — Z00.00 HEALTHCARE MAINTENANCE: ICD-10-CM

## 2021-07-16 DIAGNOSIS — Z23 IMMUNIZATION DUE: Primary | ICD-10-CM

## 2021-07-16 LAB
ALBUMIN SERPL BCP-MCNC: 3.7 G/DL (ref 3.5–5)
ALP SERPL-CCNC: 97 U/L (ref 46–116)
ALT SERPL W P-5'-P-CCNC: 58 U/L (ref 12–78)
ANION GAP SERPL CALCULATED.3IONS-SCNC: 4 MMOL/L (ref 4–13)
AST SERPL W P-5'-P-CCNC: 42 U/L (ref 5–45)
BASOPHILS # BLD AUTO: 0.03 THOUSANDS/ΜL (ref 0–0.1)
BASOPHILS NFR BLD AUTO: 0 % (ref 0–1)
BILIRUB SERPL-MCNC: 0.67 MG/DL (ref 0.2–1)
BUN SERPL-MCNC: 14 MG/DL (ref 5–25)
CALCIUM SERPL-MCNC: 9.1 MG/DL (ref 8.3–10.1)
CHLORIDE SERPL-SCNC: 106 MMOL/L (ref 100–108)
CHOLEST SERPL-MCNC: 303 MG/DL (ref 50–200)
CO2 SERPL-SCNC: 30 MMOL/L (ref 21–32)
CREAT SERPL-MCNC: 1.1 MG/DL (ref 0.6–1.3)
EOSINOPHIL # BLD AUTO: 0.29 THOUSAND/ΜL (ref 0–0.61)
EOSINOPHIL NFR BLD AUTO: 4 % (ref 0–6)
ERYTHROCYTE [DISTWIDTH] IN BLOOD BY AUTOMATED COUNT: 13.4 % (ref 11.6–15.1)
GFR SERPL CREATININE-BSD FRML MDRD: 57 ML/MIN/1.73SQ M
GLUCOSE P FAST SERPL-MCNC: 95 MG/DL (ref 65–99)
HCT VFR BLD AUTO: 41.4 % (ref 34.8–46.1)
HDLC SERPL-MCNC: 112 MG/DL
HGB BLD-MCNC: 13.5 G/DL (ref 11.5–15.4)
IMM GRANULOCYTES # BLD AUTO: 0.02 THOUSAND/UL (ref 0–0.2)
IMM GRANULOCYTES NFR BLD AUTO: 0 % (ref 0–2)
LDLC SERPL CALC-MCNC: 179 MG/DL (ref 0–100)
LYMPHOCYTES # BLD AUTO: 1.92 THOUSANDS/ΜL (ref 0.6–4.47)
LYMPHOCYTES NFR BLD AUTO: 26 % (ref 14–44)
MCH RBC QN AUTO: 31.1 PG (ref 26.8–34.3)
MCHC RBC AUTO-ENTMCNC: 32.6 G/DL (ref 31.4–37.4)
MCV RBC AUTO: 95 FL (ref 82–98)
MONOCYTES # BLD AUTO: 0.57 THOUSAND/ΜL (ref 0.17–1.22)
MONOCYTES NFR BLD AUTO: 8 % (ref 4–12)
NEUTROPHILS # BLD AUTO: 4.52 THOUSANDS/ΜL (ref 1.85–7.62)
NEUTS SEG NFR BLD AUTO: 62 % (ref 43–75)
NRBC BLD AUTO-RTO: 0 /100 WBCS
PLATELET # BLD AUTO: 328 THOUSANDS/UL (ref 149–390)
PMV BLD AUTO: 10.4 FL (ref 8.9–12.7)
POTASSIUM SERPL-SCNC: 4.2 MMOL/L (ref 3.5–5.3)
PROT SERPL-MCNC: 7.1 G/DL (ref 6.4–8.2)
RBC # BLD AUTO: 4.34 MILLION/UL (ref 3.81–5.12)
SODIUM SERPL-SCNC: 140 MMOL/L (ref 136–145)
TRIGL SERPL-MCNC: 59 MG/DL
TSH SERPL DL<=0.05 MIU/L-ACNC: 2.93 UIU/ML (ref 0.36–3.74)
WBC # BLD AUTO: 7.35 THOUSAND/UL (ref 4.31–10.16)

## 2021-07-16 PROCEDURE — 85025 COMPLETE CBC W/AUTO DIFF WBC: CPT

## 2021-07-16 PROCEDURE — 80061 LIPID PANEL: CPT

## 2021-07-16 PROCEDURE — 90471 IMMUNIZATION ADMIN: CPT

## 2021-07-16 PROCEDURE — 80053 COMPREHEN METABOLIC PANEL: CPT

## 2021-07-16 PROCEDURE — 84443 ASSAY THYROID STIM HORMONE: CPT

## 2021-07-16 PROCEDURE — 36415 COLL VENOUS BLD VENIPUNCTURE: CPT

## 2021-07-16 PROCEDURE — 90750 HZV VACC RECOMBINANT IM: CPT

## 2021-07-16 NOTE — PROGRESS NOTES
Pt presented in office for Shingrix vaccine 1st dose  Immunization given left deltoid after consent received

## 2021-08-02 ENCOUNTER — TELEPHONE (OUTPATIENT)
Dept: FAMILY MEDICINE CLINIC | Facility: CLINIC | Age: 56
End: 2021-08-02

## 2021-08-02 NOTE — TELEPHONE ENCOUNTER
I called pt to see if she wanted to start on crestor and she is not sure at this time  If she decided to start it she will call us   She did agree to watch her diet

## 2021-08-02 NOTE — TELEPHONE ENCOUNTER
----- Message from Yovanny Marie MD sent at 8/1/2021  5:23 PM EDT -----    Blood work came back showing high cholesterol  I recommend Crestor 10 mg 1 tablet daily  Her kidney function improved  Continue to encourage oral hydration  If she is agreeable to take Crestor I recommend repeat blood work in 3 months

## 2021-08-17 ENCOUNTER — TELEPHONE (OUTPATIENT)
Dept: ADMINISTRATIVE | Facility: OTHER | Age: 56
End: 2021-08-17

## 2021-08-17 NOTE — TELEPHONE ENCOUNTER
----- Message from Emil Desai sent at 8/17/2021  8:06 AM EDT -----  Regarding: Mammo  08/17/21 8:06 AM    Hello, our patient Jaci Alexander has had Mammogram completed/performed  Please assist in updating the patient chart by pulling the document from Encounters Tab within Chart Review  The date of service is 8/16/21       Thank you,  Emil Desai   [address]

## 2021-08-17 NOTE — TELEPHONE ENCOUNTER
Upon review of the In Basket request we have found/obtained the documentation  The status of this report is in progress/pending/awaiting final  Due to protocols, we are unable to hold requests for resulting/linking of a pending/ in progress/awaiting final items and are unable to proceed  Any additional questions or concerns should be emailed to the Practice Liaisons via [email or web address] email, please do not reply via In Basket      Thank you  Donal Menendez MA

## 2021-10-22 ENCOUNTER — OFFICE VISIT (OUTPATIENT)
Dept: SLEEP CENTER | Facility: CLINIC | Age: 56
End: 2021-10-22
Payer: COMMERCIAL

## 2021-10-22 VITALS
BODY MASS INDEX: 32.99 KG/M2 | DIASTOLIC BLOOD PRESSURE: 70 MMHG | SYSTOLIC BLOOD PRESSURE: 122 MMHG | HEIGHT: 63 IN | WEIGHT: 186.2 LBS

## 2021-10-22 DIAGNOSIS — F45.8 BRUXISM: ICD-10-CM

## 2021-10-22 DIAGNOSIS — G47.09 OTHER INSOMNIA: ICD-10-CM

## 2021-10-22 DIAGNOSIS — G47.33 OBSTRUCTIVE SLEEP APNEA SYNDROME: Primary | ICD-10-CM

## 2021-10-22 DIAGNOSIS — F32.A DEPRESSIVE DISORDER: ICD-10-CM

## 2021-10-22 DIAGNOSIS — E66.9 OBESITY (BMI 30-39.9): ICD-10-CM

## 2021-10-22 DIAGNOSIS — G25.81 RLS (RESTLESS LEGS SYNDROME): ICD-10-CM

## 2021-10-22 DIAGNOSIS — K21.00 GASTROESOPHAGEAL REFLUX DISEASE WITH ESOPHAGITIS, UNSPECIFIED WHETHER HEMORRHAGE: ICD-10-CM

## 2021-10-22 DIAGNOSIS — G47.19 EXCESSIVE DAYTIME SLEEPINESS: ICD-10-CM

## 2021-10-22 DIAGNOSIS — F40.240 CLAUSTROPHOBIA: ICD-10-CM

## 2021-10-22 DIAGNOSIS — R06.83 SNORING: ICD-10-CM

## 2021-10-22 PROCEDURE — 3008F BODY MASS INDEX DOCD: CPT | Performed by: INTERNAL MEDICINE

## 2021-10-22 PROCEDURE — 99204 OFFICE O/P NEW MOD 45 MIN: CPT | Performed by: INTERNAL MEDICINE

## 2021-11-29 ENCOUNTER — CLINICAL SUPPORT (OUTPATIENT)
Dept: FAMILY MEDICINE CLINIC | Facility: CLINIC | Age: 56
End: 2021-11-29

## 2021-11-29 DIAGNOSIS — Z23 IMMUNIZATION DUE: Primary | ICD-10-CM

## 2021-11-29 PROCEDURE — 90471 IMMUNIZATION ADMIN: CPT

## 2021-11-29 PROCEDURE — 90750 HZV VACC RECOMBINANT IM: CPT

## 2021-12-17 ENCOUNTER — VBI (OUTPATIENT)
Dept: ADMINISTRATIVE | Facility: OTHER | Age: 56
End: 2021-12-17

## 2021-12-20 ENCOUNTER — TELEPHONE (OUTPATIENT)
Dept: FAMILY MEDICINE CLINIC | Facility: CLINIC | Age: 56
End: 2021-12-20

## 2021-12-21 ENCOUNTER — TELEMEDICINE (OUTPATIENT)
Dept: FAMILY MEDICINE CLINIC | Facility: CLINIC | Age: 56
End: 2021-12-21
Payer: COMMERCIAL

## 2021-12-21 ENCOUNTER — HOSPITAL ENCOUNTER (OUTPATIENT)
Dept: INFUSION CENTER | Facility: HOSPITAL | Age: 56
Discharge: HOME | End: 2021-12-21
Payer: COMMERCIAL

## 2021-12-21 ENCOUNTER — TELEPHONE (OUTPATIENT)
Dept: FAMILY MEDICINE CLINIC | Facility: CLINIC | Age: 56
End: 2021-12-21

## 2021-12-21 VITALS
DIASTOLIC BLOOD PRESSURE: 84 MMHG | TEMPERATURE: 97.2 F | HEART RATE: 90 BPM | SYSTOLIC BLOOD PRESSURE: 141 MMHG | OXYGEN SATURATION: 98 % | RESPIRATION RATE: 20 BRPM

## 2021-12-21 VITALS — BODY MASS INDEX: 31.54 KG/M2 | WEIGHT: 178 LBS | HEIGHT: 63 IN

## 2021-12-21 DIAGNOSIS — J06.9 ACUTE RESPIRATORY DISEASE DUE TO COVID-19 VIRUS: Primary | ICD-10-CM

## 2021-12-21 DIAGNOSIS — U07.1 ACUTE RESPIRATORY DISEASE DUE TO COVID-19 VIRUS: Primary | ICD-10-CM

## 2021-12-21 PROCEDURE — 3008F BODY MASS INDEX DOCD: CPT | Performed by: FAMILY MEDICINE

## 2021-12-21 PROCEDURE — 99214 OFFICE O/P EST MOD 30 MIN: CPT | Performed by: FAMILY MEDICINE

## 2021-12-21 PROCEDURE — M0245 HB BAMLAN AND ETESEV INF ADMIN: HCPCS | Performed by: FAMILY MEDICINE

## 2021-12-21 RX ORDER — SODIUM CHLORIDE 9 MG/ML
20 INJECTION, SOLUTION INTRAVENOUS ONCE AS NEEDED
Status: DISCONTINUED | OUTPATIENT
Start: 2021-12-21 | End: 2021-12-24 | Stop reason: HOSPADM

## 2021-12-21 RX ORDER — ALBUTEROL SULFATE 90 UG/1
3 AEROSOL, METERED RESPIRATORY (INHALATION) ONCE AS NEEDED
Status: DISCONTINUED | OUTPATIENT
Start: 2021-12-21 | End: 2021-12-24 | Stop reason: HOSPADM

## 2021-12-21 RX ORDER — ACETAMINOPHEN 325 MG/1
650 TABLET ORAL ONCE AS NEEDED
Status: CANCELLED | OUTPATIENT
Start: 2021-12-21

## 2021-12-21 RX ORDER — ALBUTEROL SULFATE 90 UG/1
3 AEROSOL, METERED RESPIRATORY (INHALATION) ONCE AS NEEDED
Status: CANCELLED | OUTPATIENT
Start: 2021-12-21

## 2021-12-21 RX ORDER — ACETAMINOPHEN 325 MG/1
650 TABLET ORAL ONCE AS NEEDED
Status: DISCONTINUED | OUTPATIENT
Start: 2021-12-21 | End: 2021-12-24 | Stop reason: HOSPADM

## 2021-12-21 RX ORDER — ONDANSETRON 2 MG/ML
4 INJECTION INTRAMUSCULAR; INTRAVENOUS ONCE AS NEEDED
Status: CANCELLED | OUTPATIENT
Start: 2021-12-21

## 2021-12-21 RX ORDER — SODIUM CHLORIDE 9 MG/ML
20 INJECTION, SOLUTION INTRAVENOUS ONCE AS NEEDED
Status: CANCELLED | OUTPATIENT
Start: 2021-12-21

## 2021-12-21 RX ORDER — ONDANSETRON 2 MG/ML
4 INJECTION INTRAMUSCULAR; INTRAVENOUS ONCE AS NEEDED
Status: DISCONTINUED | OUTPATIENT
Start: 2021-12-21 | End: 2021-12-24 | Stop reason: HOSPADM

## 2021-12-21 RX ADMIN — SODIUM CHLORIDE 20 ML/HR: 0.9 INJECTION, SOLUTION INTRAVENOUS at 17:36

## 2022-01-14 ENCOUNTER — TELEPHONE (OUTPATIENT)
Dept: ADMINISTRATIVE | Facility: OTHER | Age: 57
End: 2022-01-14

## 2022-01-14 NOTE — TELEPHONE ENCOUNTER
----- Message from Brii Dunaway sent at 1/14/2022 10:46 AM EST -----  Regarding: Mammo  01/14/22 10:47 AM    Hello, our patient Zander Chavez has had Mammogram completed/performed  Please assist in updating the patient chart by pulling the Care Everywhere (CE) document  The date of service is 8/17/21       Thank you,  Brii Dunaway  [facility] [phone] [facility]

## 2022-01-14 NOTE — TELEPHONE ENCOUNTER
----- Message from Quan Varma sent at 1/14/2022 10:46 AM EST -----  Regarding: Mammo  01/14/22 10:47 AM    Hello, our patient Sandro Hernandez has had Mammogram completed/performed  Please assist in updating the patient chart by pulling the Care Everywhere (CE) document  The date of service is 8/17/21       Thank you,  Quan Varma  [facility] [phone] [facility]

## 2022-01-17 NOTE — TELEPHONE ENCOUNTER
Upon review of the In Basket request we were able to locate, review, and update the patient chart as requested for Mammogram     Any additional questions or concerns should be emailed to the Practice Liaisons via [email or web address] email, please do not reply via In Basket      Thank you  Karen Robertson

## 2022-02-17 ENCOUNTER — VBI (OUTPATIENT)
Dept: ADMINISTRATIVE | Facility: OTHER | Age: 57
End: 2022-02-17

## 2022-03-11 ENCOUNTER — HOSPITAL ENCOUNTER (OUTPATIENT)
Dept: SLEEP CENTER | Facility: CLINIC | Age: 57
Discharge: HOME | End: 2022-03-11
Payer: COMMERCIAL

## 2022-03-11 DIAGNOSIS — G47.33 OBSTRUCTIVE SLEEP APNEA SYNDROME: ICD-10-CM

## 2022-03-11 PROCEDURE — G0399 HOME SLEEP TEST/TYPE 3 PORTA: HCPCS

## 2022-03-12 NOTE — PROGRESS NOTES
Home Sleep Study Documentation    Pre-Sleep Home Study:    Set-up and instructions performed by: MANUEL Bailey     Technician performed demonstration for Patient: yes    Return demonstration performed by Patient: yes    Written instructions provided to Patient: yes    Patient signed consent form: yes        Post-Sleep Home Study:    Additional comments by Patient:         Home Sleep Study Failed:no:    Failure reason: N/A    Reported or Detected: N/A    Scored by: FRIDA Aldana

## 2022-03-14 PROBLEM — G47.33 OBSTRUCTIVE SLEEP APNEA SYNDROME: Status: ACTIVE | Noted: 2021-07-06

## 2022-03-17 ENCOUNTER — TELEPHONE (OUTPATIENT)
Dept: SLEEP CENTER | Facility: CLINIC | Age: 57
End: 2022-03-17

## 2022-03-17 NOTE — TELEPHONE ENCOUNTER
Advised patient sleep study shows moderate EKTA   Per Dr Danica Reilly note patient is not willing to try CPAP   Follow up with Dr Hailee Chaparro already scheduled

## 2022-04-15 ENCOUNTER — OFFICE VISIT (OUTPATIENT)
Dept: SLEEP CENTER | Facility: CLINIC | Age: 57
End: 2022-04-15
Payer: COMMERCIAL

## 2022-04-15 VITALS
HEIGHT: 63 IN | DIASTOLIC BLOOD PRESSURE: 87 MMHG | BODY MASS INDEX: 31.54 KG/M2 | HEART RATE: 104 BPM | WEIGHT: 178 LBS | SYSTOLIC BLOOD PRESSURE: 128 MMHG

## 2022-04-15 DIAGNOSIS — G47.33 OBSTRUCTIVE SLEEP APNEA SYNDROME: Primary | ICD-10-CM

## 2022-04-15 DIAGNOSIS — G25.81 RLS (RESTLESS LEGS SYNDROME): ICD-10-CM

## 2022-04-15 DIAGNOSIS — F45.8 BRUXISM: ICD-10-CM

## 2022-04-15 DIAGNOSIS — F40.240 CLAUSTROPHOBIA: ICD-10-CM

## 2022-04-15 DIAGNOSIS — G47.19 EXCESSIVE DAYTIME SLEEPINESS: ICD-10-CM

## 2022-04-15 DIAGNOSIS — E66.9 OBESITY (BMI 30-39.9): ICD-10-CM

## 2022-04-15 DIAGNOSIS — F32.A DEPRESSIVE DISORDER: ICD-10-CM

## 2022-04-15 DIAGNOSIS — K21.00 GASTROESOPHAGEAL REFLUX DISEASE WITH ESOPHAGITIS, UNSPECIFIED WHETHER HEMORRHAGE: ICD-10-CM

## 2022-04-15 DIAGNOSIS — G47.09 OTHER INSOMNIA: ICD-10-CM

## 2022-04-15 PROCEDURE — 3008F BODY MASS INDEX DOCD: CPT | Performed by: INTERNAL MEDICINE

## 2022-04-15 PROCEDURE — 99214 OFFICE O/P EST MOD 30 MIN: CPT | Performed by: INTERNAL MEDICINE

## 2022-04-15 NOTE — PROGRESS NOTES
Follow-up Note - Tonja 85  55 y o  female  :1965  GKX:[id number]  DOS:4/15/2022    I saw Kyle in sleep clinic today for her sleep complaints & comorbidities  Home sleep testing was undertaken to evaluate for sleep disordered breathing and patient is here to review results and further options  The study demonstrated : HEATHER (respiratory event index of) 14 6 /hour  Minimum oxygen saturation 81 %  and 10 3% of total sleep time was spent with saturations less than 90%  The snore index was 41 7%  PFSH, Problem List, Medications & Allergies were reviewed in EMR  Interval changes: none reported  She  has a past medical history of Depression, Disease of thyroid gland, Hyperlipidemia, Hyperlipidemia, and Kidney stone  She has a current medication list which includes the following prescription(s): bupropion, venlafaxine, and daily value multivitamin  HPI:  She reports some improvement in her sleep but continues to report constant fatigue and daytime drowsiness  She has restless leg symptoms sporadically that can delay sleep  She also has ongoing feelings of anxiety and depression  Sleep Routine: No interval changes  Kyle reports getting 6 hrs sleep  ; she has difficulty initiating sleep, but not maintaining sleep   She arises requiring aid of multiple alarms  and never feels rested  Kyle reports Excessive Daytime Sleepiness, feels like napping but does not have the opportunity  She rated herself at Total score: 13 /24 on the [place] Sleepiness Scale  Habits:  reports that she has never smoked  She has never used smokeless tobacco  She reports current alcohol use  She reports that she does not use drugs  She uses caffeine up until bedtime  She is not exercising  ROS: reviewed & as attached  Significant for few lb weight reduction since her last visit  Francisco Zepeda        EXAM: /87 (BP Location: Left arm, Patient Position: Sitting, Cuff Size: Large)   Pulse 104   Ht 5' 3" (1 6 m)   Wt 80 7 kg (178 lb)   LMP  (LMP Unknown)   BMI 31 53 kg/m²     Patient is well groomed; well appearing  CNS: Alert, orientated, clear & coherent speech  Psych: cooperativeand in no distress  Mental state:appears normal   H&N: EOMI; NC/AT:no facial pressure marks, no rashes  Skin/Extrem: col & hydration normal; no edema  Resp: Respiratory effort is normal  Physical findings otherwise essentially unchanged from previous  IMPRESSION: Diagnoses, Problem List Items & Comorbidities Addressed this Visit   1  Obstructive sleep apnea syndrome  Cpap DME   2  Other insomnia     3  RLS (restless legs syndrome)     4  Bruxism     5  Excessive daytime sleepiness     6  Claustrophobia     7  Gastroesophageal reflux disease with esophagitis, unspecified whether hemorrhage     8  Depressive disorder     9  Obesity (BMI 30-39  9)         PLAN:    1  I reviewed results of the Sleep studies with the patient  2  With respect to above conditions, I counseled on pathophysiology, diagnosis, treatment options, risks and benefits; inter-relationship and effects on symptoms and comorbidities; risks of no treatment; costs and insurance aspects  3  Patient elected positive airway pressure therapy  She is claustrophobic but felt she would be able to tolerate CPAP   Care coordinated with the DME provider for set up  Pressure settin-16 cm H2O  4  Need for compliance with therapy and weight reduction were emphasized  5  I have not initiated any medication for restless leg symptoms at this time  6  Multi component Cognitive behavioral therapy for Insomnia undertaken - Sleep Restriction, Stimulus control, Relaxation techniques and Sleep hygiene were discussed  7  Follow-up to be scheduled in 2 months to monitor compliance, progress and to adjust therapy  Thank you for allowing me to participate in the care of this patient  I will keep you apprised of developments      Sincerely,    Authenticated electronically by Stacy Hernandez MD on 93/61/94   Board Certified Specialist     Portions of the record may have been created with voice recognition software  Occasional wrong word or "sound a like" substitutions may have occurred due to the inherent limitations of voice recognition software  There may also be notations and random deletions of words or characters from malfunctioning software  Read the chart carefully and recognize, using context, where substitutions/deletions have occurred

## 2022-04-15 NOTE — PATIENT INSTRUCTIONS

## 2022-04-15 NOTE — PROGRESS NOTES
Review of Systems      Genitourinary none   Cardiology none   Gastrointestinal none   Neurology forgetfulness and poor concentration or confusion,    Constitutional claustrophobia, fatigue and excessive sweating at night   Integumentary none   Psychiatry anxiety, depression and aggressiveness or irritability   Musculoskeletal none   Pulmonary shortness of breath with activity and snoring   ENT ringing in ears   Endocrine none   Hematological none

## 2022-05-06 ENCOUNTER — TELEPHONE (OUTPATIENT)
Dept: SLEEP CENTER | Facility: CLINIC | Age: 57
End: 2022-05-06

## 2022-05-10 ENCOUNTER — TELEPHONE (OUTPATIENT)
Dept: SLEEP CENTER | Facility: CLINIC | Age: 57
End: 2022-05-10

## 2022-05-10 LAB

## 2022-05-10 NOTE — TELEPHONE ENCOUNTER
Pt  was set up today in [facility] on ResMed Auto CPAP 6-16cm and given AirFit F20 (S) mask

## 2022-07-08 ENCOUNTER — OFFICE VISIT (OUTPATIENT)
Dept: SLEEP CENTER | Facility: CLINIC | Age: 57
End: 2022-07-08
Payer: COMMERCIAL

## 2022-07-08 VITALS
WEIGHT: 182 LBS | HEIGHT: 63 IN | SYSTOLIC BLOOD PRESSURE: 133 MMHG | DIASTOLIC BLOOD PRESSURE: 81 MMHG | HEART RATE: 103 BPM | BODY MASS INDEX: 32.25 KG/M2

## 2022-07-08 DIAGNOSIS — F32.A DEPRESSIVE DISORDER: ICD-10-CM

## 2022-07-08 DIAGNOSIS — F45.8 BRUXISM: ICD-10-CM

## 2022-07-08 DIAGNOSIS — G25.81 RLS (RESTLESS LEGS SYNDROME): ICD-10-CM

## 2022-07-08 DIAGNOSIS — K21.00 GASTROESOPHAGEAL REFLUX DISEASE WITH ESOPHAGITIS, UNSPECIFIED WHETHER HEMORRHAGE: ICD-10-CM

## 2022-07-08 DIAGNOSIS — G47.19 EXCESSIVE DAYTIME SLEEPINESS: ICD-10-CM

## 2022-07-08 DIAGNOSIS — G47.09 OTHER INSOMNIA: ICD-10-CM

## 2022-07-08 DIAGNOSIS — E66.9 OBESITY (BMI 30-39.9): ICD-10-CM

## 2022-07-08 DIAGNOSIS — G47.33 OBSTRUCTIVE SLEEP APNEA SYNDROME: Primary | ICD-10-CM

## 2022-07-08 PROCEDURE — 99214 OFFICE O/P EST MOD 30 MIN: CPT | Performed by: INTERNAL MEDICINE

## 2022-07-08 NOTE — PATIENT INSTRUCTIONS

## 2022-07-08 NOTE — PROGRESS NOTES
Follow-Up Note - Tonja 85  55 y o  female  :1965  ZKM:[id number]  DOS:2022    CC: I saw this patient for follow-up in clinic today for Sleep disordered breathing, Coexisting Sleep and Medical Problems  Home sleep testing in 2022 demonstrated : HEATHER (respiratory event index of) 14 6 /hour  Minimum oxygen saturation 81 %  and 10 3% of total sleep time was spent with saturations less than 90%  The snore index was 41 7%  PFSH, Problem List, Medications & Allergies were reviewed in EMR  Interval changes: none reported  She  has a past medical history of Depression, Disease of thyroid gland, Hyperlipidemia, Hyperlipidemia, and Kidney stone  She has a current medication list which includes the following prescription(s): bupropion, venlafaxine, and daily value multivitamin  PHYSIOLOGICAL DATA REVIEW AND INTERPRETATION:    using PAP > 4 hours/night 87%  Estimated HEATHER 1 3/hour with pressure of 14 3cm H2O @90th/95th percentile; and is averaging just over 4 hours use each night  Patient has not been using ozone based devices to sanitize the machine  SUBJECTIVE: Regarding use of PAP, Maicol Zabala reports:   · no adverse effects: ; has not noticed any fibres or foreign material in air line  · She is benefiting from use: sleeping better   · Recently she has not been experiencing restless leg symptoms  · She is not aware of teeth grinding during sleep  Sleep Routine: Maicol Zabala reports getting >7 hrs sleep  ; she has difficulty initiating sleep, but not maintaining sleep   She arises spontaneously and never feels rested  Maicol Zabala reports Excessive Daytime Sleepiness, feels like napping & does when has the opportunity  She rated herself at Total score: 15 /24 on the [place] Sleepiness Scale  Habits: reports that she has never smoked   She has never used smokeless tobacco ,  reports current alcohol use ,  reports no history of drug use , Caffeine use:excessive until 8:29 p m ; Exercise routine: none because I lack the energy        ROS: reviewed & as attached  Significant for few lb weight gain since her last visit  She reports postnasal drip and shortness of breath  She has acid reflux  She reports difficulty with memory and ongoing feelings of anxiety and depression in spite of current medications  EXAM: /81 (BP Location: Left arm, Patient Position: Sitting, Cuff Size: Adult)   Pulse 103   Ht 5' 3" (1 6 m)   Wt 82 6 kg (182 lb)   LMP  (LMP Unknown)   BMI 32 24 kg/m²     Wt Readings from Last 3 Encounters:   07/08/22 82 6 kg (182 lb)   04/15/22 80 7 kg (178 lb)   12/21/21 80 7 kg (178 lb)      Patient is well groomed; well appearing  CNS: Alert, orientated, clear & coherent speech  Psych: cooperativeand in no distress  Mental state:appears anxious and has a constricted affect  H&N: EOMI; NC/AT:no facial pressure marks, no rashes  Skin/Extrem: col & hydration normal; no edema  Resp: Respiratory effort is normal  Physical findings otherwise essentially unchanged from previous  IMPRESSION: Problem List Items & Comorbidities Addressed this Visit    1  Obstructive sleep apnea syndrome  PAP DME Resupply/Reorder    PAP DME Pressure Change   2  Other insomnia     3  RLS (restless legs syndrome)     4  Bruxism     5  Excessive daytime sleepiness     6  Depressive disorder     7  Obesity (BMI 30-39 9)     8  Gastroesophageal reflux disease with esophagitis, unspecified whether hemorrhage         PLAN:  1  I reviewed results of prior studies and physiologic data with the patient  2  I discussed treatment options with risks and benefits  She declined an in-lab titration study at this time  3  Treatment with  PAP is medically necessary and Jake Cowart is agreable to continue use  4  Care of equipment, methods to improve comfort using PAP and importance of compliance with therapy were discussed    5  Pressure setting: change 8-16 cmH2O     6  Rx provided to replace supplies and Care coordinated with DME provider  7  No medication is needed for restless leg symptoms at this time  However, she may be having periodic limb movements of sleep that could explain some of her fatigue  8  I advised on sleep hygiene, specifically avoiding caffeine use after 3-4 p m , starting an exercise routine and using CPAP for entire sleep duration  9  Discussed strategies for weight reduction  10  If symptoms persist in spite of above strategies, she may need to have her psychiatric medications reviewed  11  The only other option would be an in-lab titration study  12  Follow-up is advised in 1 year or sooner if needed to monitor progress, compliance and to adjust therapy  Thank you for allowing me to participate in the care of this patient  Sincerely,    Authenticated electronically by Hong Domínguez MD on 39/66/75   Board Certified Specialist     Portions of the record may have been created with voice recognition software  Occasional wrong word or "sound a like" substitutions may have occurred due to the inherent limitations of voice recognition software  There may also be notations and random deletions of words or characters from malfunctioning software  Read the chart carefully and recognize, using context, where substitutions/deletions have occurred

## 2022-07-08 NOTE — PROGRESS NOTES
Review of Systems      Genitourinary hot flashes at night   Cardiology none   Gastrointestinal frequent heartburn/acid reflux   Neurology forgetfulness, poor concentration or confusion,  and difficulty with memory   Constitutional claustrophobia, fatigue and excessive sweating at night   Integumentary none   Psychiatry anxiety, depression and aggressiveness or irritability   Musculoskeletal none   Pulmonary shortness of breath with activity and snoring   ENT throat clearing and ringing in ears   Endocrine none   Hematological none

## 2022-07-11 ENCOUNTER — TELEPHONE (OUTPATIENT)
Dept: SLEEP CENTER | Facility: CLINIC | Age: 57
End: 2022-07-11

## 2022-07-11 LAB
DME PARACHUTE DELIVERY DATE ACTUAL: NORMAL
DME PARACHUTE DELIVERY DATE REQUESTED: NORMAL
DME PARACHUTE ITEM DESCRIPTION: NORMAL
DME PARACHUTE ORDER STATUS: NORMAL
DME PARACHUTE SUPPLIER NAME: NORMAL
DME PARACHUTE SUPPLIER PHONE: NORMAL

## 2022-08-16 ENCOUNTER — VBI (OUTPATIENT)
Dept: ADMINISTRATIVE | Facility: OTHER | Age: 57
End: 2022-08-16

## 2022-08-18 ENCOUNTER — APPOINTMENT (OUTPATIENT)
Dept: LAB | Facility: IMAGING CENTER | Age: 57
End: 2022-08-18
Payer: COMMERCIAL

## 2022-08-18 DIAGNOSIS — Z79.899 ENCOUNTER FOR LONG-TERM (CURRENT) USE OF OTHER MEDICATIONS: ICD-10-CM

## 2022-08-18 LAB — TSH SERPL DL<=0.05 MIU/L-ACNC: 3.07 UIU/ML (ref 0.45–4.5)

## 2022-08-18 PROCEDURE — 84443 ASSAY THYROID STIM HORMONE: CPT

## 2022-08-18 PROCEDURE — 36415 COLL VENOUS BLD VENIPUNCTURE: CPT

## 2022-10-12 PROBLEM — J06.9 ACUTE RESPIRATORY DISEASE DUE TO COVID-19 VIRUS: Status: RESOLVED | Noted: 2021-12-21 | Resolved: 2022-10-12

## 2022-10-12 PROBLEM — U07.1 ACUTE RESPIRATORY DISEASE DUE TO COVID-19 VIRUS: Status: RESOLVED | Noted: 2021-12-21 | Resolved: 2022-10-12

## 2022-12-06 ENCOUNTER — VBI (OUTPATIENT)
Dept: ADMINISTRATIVE | Facility: OTHER | Age: 57
End: 2022-12-06

## 2023-01-11 ENCOUNTER — VBI (OUTPATIENT)
Dept: ADMINISTRATIVE | Facility: OTHER | Age: 58
End: 2023-01-11

## 2023-03-07 ENCOUNTER — VBI (OUTPATIENT)
Dept: ADMINISTRATIVE | Facility: OTHER | Age: 58
End: 2023-03-07

## 2023-05-17 ENCOUNTER — VBI (OUTPATIENT)
Dept: ADMINISTRATIVE | Facility: OTHER | Age: 58
End: 2023-05-17

## 2024-02-21 PROBLEM — Z00.00 HEALTHCARE MAINTENANCE: Status: RESOLVED | Noted: 2018-12-04 | Resolved: 2024-02-21

## 2025-08-12 ENCOUNTER — OFFICE VISIT (OUTPATIENT)
Dept: FAMILY MEDICINE CLINIC | Facility: CLINIC | Age: 60
End: 2025-08-12
Payer: COMMERCIAL

## 2025-08-12 PROBLEM — R13.19 ESOPHAGEAL DYSPHAGIA: Status: ACTIVE | Noted: 2025-08-12

## 2025-08-12 PROBLEM — F32.2 SEVERE MAJOR DEPRESSIVE DISORDER (HCC): Status: ACTIVE | Noted: 2025-08-12

## 2025-08-12 PROBLEM — Z00.00 HEALTHCARE MAINTENANCE: Status: ACTIVE | Noted: 2025-08-12

## 2025-08-13 ENCOUNTER — OFFICE VISIT (OUTPATIENT)
Age: 60
End: 2025-08-13
Payer: COMMERCIAL

## 2025-08-13 ENCOUNTER — APPOINTMENT (OUTPATIENT)
Dept: LAB | Facility: IMAGING CENTER | Age: 60
End: 2025-08-13
Payer: COMMERCIAL

## 2025-08-13 DIAGNOSIS — E55.9 VITAMIN D INSUFFICIENCY: ICD-10-CM

## 2025-08-13 DIAGNOSIS — Z00.00 HEALTHCARE MAINTENANCE: ICD-10-CM

## 2025-08-13 DIAGNOSIS — E78.49 OTHER HYPERLIPIDEMIA: ICD-10-CM

## 2025-08-13 LAB
25(OH)D3 SERPL-MCNC: 33.4 NG/ML (ref 30–100)
ALBUMIN SERPL BCG-MCNC: 4.2 G/DL (ref 3.5–5)
ALP SERPL-CCNC: 74 U/L (ref 34–104)
ALT SERPL W P-5'-P-CCNC: 19 U/L (ref 7–52)
ANION GAP SERPL CALCULATED.3IONS-SCNC: 10 MMOL/L (ref 4–13)
AST SERPL W P-5'-P-CCNC: 19 U/L (ref 13–39)
BASOPHILS # BLD AUTO: 0.04 THOUSANDS/ÂΜL (ref 0–0.1)
BASOPHILS NFR BLD AUTO: 1 % (ref 0–1)
BILIRUB SERPL-MCNC: 0.47 MG/DL (ref 0.2–1)
BUN SERPL-MCNC: 14 MG/DL (ref 5–25)
CALCIUM SERPL-MCNC: 9.6 MG/DL (ref 8.4–10.2)
CHLORIDE SERPL-SCNC: 103 MMOL/L (ref 96–108)
CHOLEST SERPL-MCNC: 290 MG/DL (ref ?–200)
CO2 SERPL-SCNC: 29 MMOL/L (ref 21–32)
CREAT SERPL-MCNC: 0.93 MG/DL (ref 0.6–1.3)
EOSINOPHIL # BLD AUTO: 0.52 THOUSAND/ÂΜL (ref 0–0.61)
EOSINOPHIL NFR BLD AUTO: 7 % (ref 0–6)
ERYTHROCYTE [DISTWIDTH] IN BLOOD BY AUTOMATED COUNT: 12.8 % (ref 11.6–15.1)
GFR SERPL CREATININE-BSD FRML MDRD: 67 ML/MIN/1.73SQ M
GLUCOSE P FAST SERPL-MCNC: 99 MG/DL (ref 65–99)
HCT VFR BLD AUTO: 40.3 % (ref 34.8–46.1)
HDLC SERPL-MCNC: 89 MG/DL
HGB BLD-MCNC: 13.3 G/DL (ref 11.5–15.4)
IMM GRANULOCYTES # BLD AUTO: 0.03 THOUSAND/UL (ref 0–0.2)
IMM GRANULOCYTES NFR BLD AUTO: 0 % (ref 0–2)
LDLC SERPL CALC-MCNC: 190 MG/DL (ref 0–100)
LYMPHOCYTES # BLD AUTO: 1.78 THOUSANDS/ÂΜL (ref 0.6–4.47)
LYMPHOCYTES NFR BLD AUTO: 26 % (ref 14–44)
MCH RBC QN AUTO: 30.4 PG (ref 26.8–34.3)
MCHC RBC AUTO-ENTMCNC: 33 G/DL (ref 31.4–37.4)
MCV RBC AUTO: 92 FL (ref 82–98)
MONOCYTES # BLD AUTO: 0.43 THOUSAND/ÂΜL (ref 0.17–1.22)
MONOCYTES NFR BLD AUTO: 6 % (ref 4–12)
NEUTROPHILS # BLD AUTO: 4.19 THOUSANDS/ÂΜL (ref 1.85–7.62)
NEUTS SEG NFR BLD AUTO: 60 % (ref 43–75)
NRBC BLD AUTO-RTO: 0 /100 WBCS
PLATELET # BLD AUTO: 403 THOUSANDS/UL (ref 149–390)
PMV BLD AUTO: 9.9 FL (ref 8.9–12.7)
POTASSIUM SERPL-SCNC: 4.6 MMOL/L (ref 3.5–5.3)
PROT SERPL-MCNC: 6.7 G/DL (ref 6.4–8.4)
RBC # BLD AUTO: 4.37 MILLION/UL (ref 3.81–5.12)
SODIUM SERPL-SCNC: 142 MMOL/L (ref 135–147)
TRIGL SERPL-MCNC: 55 MG/DL (ref ?–150)
TSH SERPL DL<=0.05 MIU/L-ACNC: 2.83 UIU/ML (ref 0.45–4.5)
WBC # BLD AUTO: 6.99 THOUSAND/UL (ref 4.31–10.16)

## 2025-08-13 PROCEDURE — 36415 COLL VENOUS BLD VENIPUNCTURE: CPT

## 2025-08-13 PROCEDURE — 80061 LIPID PANEL: CPT

## 2025-08-13 PROCEDURE — 80053 COMPREHEN METABOLIC PANEL: CPT

## 2025-08-13 PROCEDURE — 82306 VITAMIN D 25 HYDROXY: CPT

## 2025-08-13 PROCEDURE — 85025 COMPLETE CBC W/AUTO DIFF WBC: CPT

## 2025-08-13 PROCEDURE — 84443 ASSAY THYROID STIM HORMONE: CPT
